# Patient Record
Sex: MALE | Race: WHITE | NOT HISPANIC OR LATINO
[De-identification: names, ages, dates, MRNs, and addresses within clinical notes are randomized per-mention and may not be internally consistent; named-entity substitution may affect disease eponyms.]

---

## 2023-03-21 PROBLEM — Z00.00 ENCOUNTER FOR PREVENTIVE HEALTH EXAMINATION: Status: ACTIVE | Noted: 2023-03-21

## 2023-05-05 ENCOUNTER — NON-APPOINTMENT (OUTPATIENT)
Age: 63
End: 2023-05-05

## 2023-05-05 ENCOUNTER — APPOINTMENT (OUTPATIENT)
Dept: PAIN MANAGEMENT | Facility: CLINIC | Age: 63
End: 2023-05-05
Payer: COMMERCIAL

## 2023-05-05 VITALS
OXYGEN SATURATION: 98 % | DIASTOLIC BLOOD PRESSURE: 77 MMHG | SYSTOLIC BLOOD PRESSURE: 116 MMHG | BODY MASS INDEX: 20.89 KG/M2 | WEIGHT: 130 LBS | HEART RATE: 56 BPM | HEIGHT: 66 IN

## 2023-05-05 DIAGNOSIS — Z78.9 OTHER SPECIFIED HEALTH STATUS: ICD-10-CM

## 2023-05-05 DIAGNOSIS — M54.12 RADICULOPATHY, CERVICAL REGION: ICD-10-CM

## 2023-05-05 DIAGNOSIS — Z83.3 FAMILY HISTORY OF DIABETES MELLITUS: ICD-10-CM

## 2023-05-05 DIAGNOSIS — Z82.49 FAMILY HISTORY OF ISCHEMIC HEART DISEASE AND OTHER DISEASES OF THE CIRCULATORY SYSTEM: ICD-10-CM

## 2023-05-05 DIAGNOSIS — M19.011 PRIMARY OSTEOARTHRITIS, RIGHT SHOULDER: ICD-10-CM

## 2023-05-05 PROCEDURE — 99203 OFFICE O/P NEW LOW 30 MIN: CPT

## 2023-05-05 NOTE — DATA REVIEWED
Department of Anesthesiology  Postprocedure Note    Patient: Christian Olsen  MRN: [id number]  YOB: 1949  Date of evaluation: 12/17/2020  Time:  4:23 PM     Procedure Summary     Date: 12/17/20 Room / Location: [facility] OR [address] [place] [address]    Anesthesia Start: 1500 Anesthesia Stop: 4569    Procedure: LEFT REVERSE TOTAL SHOULDER ARTHROPLASTY (Left ) Diagnosis: (X11.250)    Surgeons: Edward Cifuentes MD Responsible Provider: Mariusz Montaño MD    Anesthesia Type: general, regional ASA Status: 3          Anesthesia Type: general, regional    Iza Phase I: Iza Score: 9    Iza Phase II:      Last vitals: Reviewed and per EMR flowsheets.        Anesthesia Post Evaluation    Patient location during evaluation: PACU  Patient participation: complete - patient participated  Level of consciousness: awake and alert  Pain score: 0  Airway patency: patent  Nausea & Vomiting: no nausea and no vomiting  Complications: no  Cardiovascular status: blood pressure returned to baseline  Respiratory status: acceptable  Hydration status: stable
[FreeTextEntry1] : Impression;\par Postoperative changes.  Thin supraspinatus and infraspinatus tendons with increased signal suggesting tendinosis, and postsurgical change, however no evidence of fluid-containing tear.  Subscapularis tendinosis.  Mild to moderate subacromial-subdeltoid bursitis.  Subchondral irregularity and capsular thickening acromioclavicular joint, consistent with arthrosis

## 2023-05-05 NOTE — PHYSICAL EXAM
[de-identified] : General: Well-developed and well-nourished.  No acute distress.\par Psychiatric: Behavior was cooperative  \par Head:  Normocephalic and atraumatic\par Eyes:  Sclera white. Conjunctiva and eyelids pink and moist without discharge.\par Cardiovascular:  Regular\par Respiratory:  Trachea midline. Normal effort.\par No accessory muscle use with respiration\par Abdomen: Non distended, soft and nontender\par Skin:  No rashes, ulcers, or lesions appreciated.\par Neck: There is mild  pain with extension,     ROM wnl\par Back  There is no pain with extension,   ROM wnl\par Extremities: No edema \par Musculoskeletal: Moving all extremities freely \par There is positive empty can, positive scarf test\par Neuro: CN 2-12 Grossly intact\par Sensation to light touch is intact in all extremities\par Gait: Ambulates with no assistive device.

## 2023-05-05 NOTE — ASSESSMENT
[FreeTextEntry1] : Mr. HARRY WALLACE is a 62 year M suffering from RIGHT shoulder pain, that based upon today's subjective complaints, physical examination, and chart review, is likely secondary to cervical radiculopathy\par \par >> Medications\par \par Chronic opioid use for non-malignant pain, in particular at high doses would not be recommended since it can potentially lead to hyperalgesia (hypersensitivity), tolerance and addiction. \par  \par  \par >> Interventions\par  \par None indicated at this time\par  \par >> Therapy and Other Modalities\par  \par Continue with daily home stretching regimen\par \par >> Imaging and Other Studies\par \par MRI cervical spine evaluate for right-sided disc herniation at C4/5\par \par X-ray cervical spine, x-ray right shoulder evaluate for arthropathy.\par \par >> Consults\par \par None ordered

## 2023-05-05 NOTE — HISTORY OF PRESENT ILLNESS
[Back Pain] : back pain [_______] : [unfilled] [___ mths] : [unfilled] month(s) ago [Paroxysmal] : paroxysmal [0] : a current pain level of 0/10 [5] : a minimum pain level of 5/10 [7] : a maximum pain level of 7/10 [Sharp] : sharp [Dull] : dull [Aching] : aching [Burning] : burning [Shooting] : shooting [Lifting] : lifting [Medications] : medications [Heat] : heat [Ice] : ice [FreeTextEntry1] : HPI - Mr. HARRY WALLACE is a 62 year M with PHx as below, referred by SELF/ FRIEND who presents today with chief complaint of RIGHT shoulder and arm pain. Reports that it developed seven months ago. It is located in the right shoulder;  there is radiation of the pain into the right arm. The pain is presently 5/10 in severity on the numerical rating scale. It is sharp in nature. The pain is constant. There is diurnal worsening, during the course of the day. The pain is aggravated with overhead activity. The pain improves with rest. The pain is functionally and emotionally disabling for the patient as its preventing them from going about activities of daily living, such as routine housework. The pain does NOT impair the patient’s ability to sleep. \par \par Patient was NOT  been seen by pain management in the past.\par Patient attests to  6 weeks of provider directed treatment, including a provider directed stretching regimen, PRP in Feb 2023, physical therapy. Patient reports treatment that occurred within the last 3 months\par Patient report that symptoms have been persistent and and progressing after treatment\par  \par Reports there is NO present numbness, there is NO weakness. Patient denies any bowel/bladder incontinence, no saddle/perineal anesthesia or any other red flag signs or symptoms. Reports regular BMs.\par   [FreeTextEntry2] : 15 [FreeTextEntry7] : Referred by a friend.  Patient presents right shoulder radiating to back and forearm. [FreeTextEntry3] : Holding position for too long. [FreeTextEntry4] : PT, PRP Injections

## 2023-05-05 NOTE — REVIEW OF SYSTEMS
[Radiating Pain] : radiating pain [Joint Stiffness] : joint stiffness [Decreased ROM] : decreased range of motion [Weakness] : weakness [Negative] : Heme/Lymph [FreeTextEntry9] : Shoulder Pain

## 2023-05-25 ENCOUNTER — TRANSCRIPTION ENCOUNTER (OUTPATIENT)
Age: 63
End: 2023-05-25

## 2023-08-24 ENCOUNTER — NON-APPOINTMENT (OUTPATIENT)
Age: 63
End: 2023-08-24

## 2023-08-25 ENCOUNTER — NON-APPOINTMENT (OUTPATIENT)
Age: 63
End: 2023-08-25

## 2024-02-13 ENCOUNTER — RESULT REVIEW (OUTPATIENT)
Age: 64
End: 2024-02-13